# Patient Record
Sex: FEMALE | Race: WHITE | Employment: OTHER | URBAN - METROPOLITAN AREA
[De-identification: names, ages, dates, MRNs, and addresses within clinical notes are randomized per-mention and may not be internally consistent; named-entity substitution may affect disease eponyms.]

---

## 2017-01-27 ENCOUNTER — OFFICE VISIT (OUTPATIENT)
Dept: CARDIOLOGY CLINIC | Age: 82
End: 2017-01-27

## 2017-01-27 VITALS
HEIGHT: 64 IN | HEART RATE: 73 BPM | OXYGEN SATURATION: 98 % | SYSTOLIC BLOOD PRESSURE: 160 MMHG | BODY MASS INDEX: 22.84 KG/M2 | DIASTOLIC BLOOD PRESSURE: 78 MMHG | WEIGHT: 133.8 LBS

## 2017-01-27 DIAGNOSIS — I10 ESSENTIAL HYPERTENSION: ICD-10-CM

## 2017-01-27 DIAGNOSIS — E78.2 MIXED HYPERLIPIDEMIA: ICD-10-CM

## 2017-01-27 DIAGNOSIS — I25.10 CORONARY ARTERY DISEASE INVOLVING NATIVE CORONARY ARTERY OF NATIVE HEART WITHOUT ANGINA PECTORIS: Primary | ICD-10-CM

## 2017-01-27 RX ORDER — NEBIVOLOL 10 MG/1
10 TABLET ORAL DAILY
Qty: 30 TAB | Refills: 5 | Status: SHIPPED | OUTPATIENT
Start: 2017-01-27 | End: 2017-10-18 | Stop reason: SDUPTHER

## 2017-01-27 RX ORDER — SIMVASTATIN 40 MG/1
TABLET, FILM COATED ORAL
COMMUNITY

## 2017-01-27 NOTE — PROGRESS NOTES
LAST OFFICE VISIT : Visit date not found        ICD-10-CM ICD-9-CM   1. Coronary artery disease involving native coronary artery of native heart without angina pectoris I25.10 414.01            Anna Turner is a 80 y.o. female with hypertension and dyslipidemia referred for 6 month follow up. Cardiac risk factors: dyslipidemia, sedentary life style, hypertension, post-menopausal.   I have personally obtained the history from the patient. HISTORY OF PRESENTING ILLNESS      She is doing well. She continues to walk on her own with no issue. She stays her BP is consitantly elevated. The patient denies chest pain/ shortness of breath, orthopnea, PND, LE edema, palpitations, syncope, presyncope or fatigue. ACTIVE PROBLEM LIST     Patient Active Problem List    Diagnosis Date Noted    S/P CABG x 3 04/27/2012    Coronary artery disease 04/25/2012    Coronary atherosclerosis of native coronary artery 03/27/2012    Mixed hyperlipidemia 02/02/2011           PAST MEDICAL HISTORY     Past Medical History   Diagnosis Date    Anemia     CAD (coronary artery disease)     Carotid disease, bilateral ([address]     Coronary atherosclerosis of native coronary artery 3/27/2012    Essential hypertension     GERD (gastroesophageal reflux disease)     Hypertension      CONTROLLED BY MEDS.  Macular degeneration 2004    Mixed hyperlipidemia            PAST SURGICAL HISTORY     Past Surgical History   Procedure Laterality Date    Hx knee replacement       left in 2001    Vas carotid duplex bilateral  8/26/11     10-49% bilateral    Hx cholecystectomy      Hx cataract removal      Hx orthopaedic       LEFT KNEE REPLACEMENT.  Hx orthopaedic       BILATERAL 3 TRIGGER FINGERS IN [facility] HAND.  Hx other surgical       NODE FROM THYROID REMOVED. ALLERGIES     Allergies   Allergen Reactions    Codeine Other (comments)     Altered mental status.           FAMILY HISTORY     Family History Problem Relation Age of Onset    Coronary Artery Disease Mother    [facility] Cancer Father     Diabetes Sister     Coronary Artery Disease Brother     negative for cardiac disease       SOCIAL HISTORY     Social History     Social History    Marital status:      Spouse name: N/A    Number of children: N/A    Years of education: N/A     Social History Main Topics    Smoking status: Never Smoker    Smokeless tobacco: Never Used    Alcohol use No    Drug use: Yes     Special: OTC, Prescription    Sexual activity: No     Other Topics Concern    None     Social History Narrative         MEDICATIONS     Current Outpatient Prescriptions   Medication Sig    simvastatin (ZOCOR) 40 mg tablet Take  by mouth nightly.  nitroglycerin (NITROSTAT) 0.4 mg SL tablet 1 Tab by SubLINGual route every five (5) minutes as needed for Chest Pain.  nebivolol (BYSTOLIC) 5 mg tablet Take  by mouth daily.  sennosides (SENNA) 8.6 mg Cap Take  by mouth as needed.  ferrous sulfate (IRON, FERROUS SULFATE,) 325 mg (65 mg Iron) tablet Take  by mouth daily (before breakfast).  CALCIUM CARBONATE/VITAMIN D3 (CALTRATE-600 PLUS VITAMIN D3 PO) Take  by mouth daily.  MULTIVITAMINS W-MINERALS/LUT (CENTRUM SILVER PO) Take 1 Tab by mouth daily. TAKES  IN PM.    aspirin 81 mg tablet Take 81 mg by mouth two (2) times a day.  omeprazole (PRILOSEC) 20 mg capsule Take 20 mg by mouth daily.  alendronate (FOSAMAX) 40 mg tablet Take  by mouth every seven (7) days.  BETA-CAROTENE,A, W-C & E/MIN (ICAPS PO) Take 4 Tabs by mouth daily. No current facility-administered medications for this visit. I have reviewed the nurses notes, vitals, problem list, allergy list, medical history, family, social history and medications. REVIEW OF SYMPTOMS      General: Pt denies excessive weight gain or loss.  Pt is able to conduct ADL's  HEENT: Denies blurred vision, headaches, hearing loss, epistaxis and difficulty swallowing. Respiratory: Denies cough, congestion, shortness of breath, PEOPLES, wheezing or stridor. Cardiovascular: Denies precordial pain, palpitations, edema or PND  Gastrointestinal: Denies poor appetite, indigestion, abdominal pain or blood in stool  Genitourinary: Denies hematuria, dysuria, increased urinary frequency  Musculoskeletal: Denies joint pain or swelling from muscles or joints  Neurologic: Denies tremor, paresthesias, headache, or sensory motor disturbance  Psychiatric: Denies confusion, insomnia, depression  Integumentray: Denies rash, itching or ulcers. Hematologic: Denies easy bruising, bleeding     PHYSICAL EXAMINATION      Vitals:    01/27/17 1135 01/27/17 1150   BP: 160/80 160/78   Pulse: 73    SpO2: 98%    Weight: 133 lb 12.8 oz (60.7 kg)    Height: 5' 4\" (1.626 m)      BP recheck: 170/80    General: Well developed, in no acute distress. looks good for age  [de-identified]: No jaundice, oral mucosa moist, no oral ulcers  Neck: Supple, no stiffness, no lymphadenopathy, supple  Heart:  Normal S1/S2 negative S3 or S4. Regular, no murmur, gallop or rub, no jugular venous distention  Respiratory: Clear bilaterally x 4, no wheezing or rales  Extremities:  No edema, normal cap refill, no cyanosis. Musculoskeletal: No clubbing, no deformities  Neuro: A&Ox3, speech clear, gait stable, cooperative, no focal neurologic deficits  Skin: Skin color is normal. No rashes or lesions. Non diaphoretic, moist.        EKG: sinus rhythm at LBBB     DIAGNOSTIC DATA     1. Myocardial perfusion study   3/27/08 - Normal Adenosine perfusion with EF 57%    2. Cholesterol profile   4/27/13 - , HDL 61, LDL 72, TG 82  10/13/15 - , , HDL 50,   4/5/16- , HDL 54, , TG 84    3. Carotid duplex  9/12/11 - Intermediate disease bilateral.     4. Cardiac catheterization  3/12 - EF 65%, 95% oLAD, pLAD 95%, mLAD 70% followed by 70%, pCX 60%, OM1 50%, pRCA 50%,pRCA 100% (collateralized)    5.  CABG   4/27/12 - X3 with LIMA to LAD, SVG to OM and SVG to RCA       LABORATORY DATA            Lab Results   Component Value Date/Time    WBC 6.2 10/24/2015 05:30 PM    HGB 13.3 10/24/2015 05:30 PM    HCT 40.5 10/24/2015 05:30 PM    PLATELET 421 52/59/5173 05:30 PM    MCV 97.6 10/24/2015 05:30 PM      Lab Results   Component Value Date/Time    Sodium 139 10/24/2015 05:30 PM    Potassium 4.4 10/24/2015 05:30 PM    Chloride 104 10/24/2015 05:30 PM    CO2 30 10/24/2015 05:30 PM    Anion gap 5 10/24/2015 05:30 PM    Glucose 93 10/24/2015 05:30 PM    BUN 26 10/24/2015 05:30 PM    Creatinine 1.22 10/24/2015 05:30 PM    BUN/Creatinine ratio 21 10/24/2015 05:30 PM    GFR est AA 50 10/24/2015 05:30 PM    GFR est non-AA 41 10/24/2015 05:30 PM    Calcium 9.3 10/24/2015 05:30 PM    Bilirubin, total 0.4 04/28/2012 04:00 AM    ALT 17 04/28/2012 04:00 AM    AST 37 04/28/2012 04:00 AM    Alk. phosphatase 34 04/28/2012 04:00 AM    Protein, total 4.3 04/28/2012 04:00 AM    Albumin 2.3 04/28/2012 04:00 AM    Globulin 2.0 04/28/2012 04:00 AM    A-G Ratio 1.2 04/28/2012 04:00 AM           ASSESSMENT/RECOMMENDATIONS:.      1. Right carotid bruit  -carotids have not been done in some time and will plan on doing carotid US on return for her BP check    2. Dyslipidemia  -cholesterol has not been checked in some time  -will consider checking it at next office appointment   -LDL goal should be lower but at 93 I do not favor increasing her statins unless there is progression of her carotid disease     3. Chest discomfort   -no complaints of this today  -no cardiac testing needed  -would treat medically with no further testing in the future     4. Hypertension  -BP is elevated to day  -increase bystolic to 10 mg daily and have her return in 10 days for BP check     6.  Follow up in 6 months or PRN    Orders Placed This Encounter    AMB POC EKG ROUTINE W/ 12 LEADS, INTER & REP     Order Specific Question:   Reason for Exam:     Answer:   cad    simvastatin (ZOCOR) 40 mg tablet     Sig: Take  by mouth nightly. Follow-up Disposition: Not on File      I have discussed the diagnosis with  Gloria Lin and the intended plan as seen in the above orders. Questions were answered concerning future plans. I have discussed medication side effects and warnings with the patient as well. Thank you,  Stephie Escamilla MD for involving me in the care of  Gloria Lin. Please do not hesitate to contact me for further questions/concerns. This note was written by adrien Samano, as dictated by Parag Cheatham MD.      Kristina Duque. MD Gael, [facility] [address] [place] [address], [address] [place] [address] [address]      [phone] / [phone] Fax

## 2017-01-27 NOTE — PROGRESS NOTES
Visit Vitals    /78 (BP 1 Location: Right arm, BP Patient Position: Sitting)    Pulse 73    Ht 5' 4\" (1.626 m)    Wt 133 lb 12.8 oz (60.7 kg)    SpO2 98%    BMI 22.97 kg/m2

## 2017-01-27 NOTE — MR AVS SNAPSHOT
Visit Information Date & Time Provider Department Dept. Phone Encounter #  
 1/27/2017 10:40 AM Destiny Carrera MD [facility] [phone] [id number] Upcoming Health Maintenance Date Due DTaP/Tdap/Td series (1 - Tdap) 12/8/1944 ZOSTER VACCINE AGE 60> 12/8/1983 GLAUCOMA SCREENING Q2Y 12/8/1988 OSTEOPOROSIS SCREENING (DEXA) 12/8/1988 MEDICARE YEARLY EXAM 12/8/1988 Pneumococcal 65+ Low/Medium Risk (2 of 2 - PPSV23) 10/1/2015 INFLUENZA AGE 9 TO ADULT 8/1/2016 Allergies as of 1/27/2017  Review Complete On: 12/22/2015 By: Destiny Carrera MD  
  
 Severity Noted Reaction Type Reactions Codeine  02/02/2011    Other (comments) Altered mental status. Current Immunizations  Reviewed on 4/29/2012 Name Date Pneumococcal Vaccine (Unspecified Type) 10/1/2010 Not reviewed this visit You Were Diagnosed With   
  
 Codes Comments Coronary artery disease involving native coronary artery of native heart without angina pectoris    -  Primary ICD-10-CM: I25.10 ICD-9-CM: 414.01 Vitals BP Pulse Height(growth percentile) Weight(growth percentile) SpO2 BMI  
 160/78 (BP 1 Location: Right arm, BP Patient Position: Sitting) 73 5' 4\" (1.626 m) 133 lb 12.8 oz (60.7 kg) 98% 22.97 kg/m2 OB Status Smoking Status Postmenopausal Never Smoker Vitals History BMI and BSA Data Body Mass Index Body Surface Area  
 22.97 kg/m 2 1.66 m 2 Preferred Pharmacy Pharmacy Name Phone [id number] [place], [address] AT [facility] [phone] Your Updated Medication List  
  
   
This list is accurate as of: 1/27/17 12:04 PM.  Always use your most recent med list.  
  
  
  
  
 aspirin 81 mg tablet Take 81 mg by mouth two (2) times a day. CALTRATE-600 PLUS VITAMIN D3 PO Take  by mouth daily.   
  
 CENTRUM SILVER PO  
 Take 1 Tab by mouth daily. TAKES  IN PM.  
  
 FOSAMAX 40 mg tablet Generic drug:  alendronate Take  by mouth every seven (7) days. ICAPS PO Take 4 Tabs by mouth daily. Iron (Ferrous Sulfate) 325 mg (65 mg iron) tablet Generic drug:  ferrous sulfate Take  by mouth daily (before breakfast). nebivolol 10 mg tablet Commonly known as:  BYSTOLIC Take 1 Tab by mouth daily. nitroglycerin 0.4 mg SL tablet Commonly known as:  NITROSTAT  
1 Tab by SubLINGual route every five (5) minutes as needed for Chest Pain. PriLOSEC 20 mg capsule Generic drug:  omeprazole Take 20 mg by mouth daily. Senna 8.6 mg Cap Generic drug:  sennosides Take  by mouth as needed. simvastatin 40 mg tablet Commonly known as:  ZOCOR Take  by mouth nightly. Prescriptions Sent to Pharmacy Refills  
 nebivolol (BYSTOLIC) 10 mg tablet 5 Sig: Take 1 Tab by mouth daily. Class: Normal  
 Pharmacy: [address] [place] [address] Ph #: [phone] Route: Oral  
  
We Performed the Following AMB POC EKG ROUTINE W/ 12 LEADS, INTER & REP [06821 CPT(R)] DUPLEX CAROTID BILATERAL [id number] CPT(R)] Introducing [facility]! Dear Ken Orlando: Thank you for requesting a [employer] account. Our records indicate that you have previously registered for a [employer] account but its currently inactive. Please call our [employer] support line at [phone]. Additional Information If you have questions, please visit the Frequently Asked Questions section of the [employer] website at [email or web address] Remember, [employer] is NOT to be used for urgent needs. For medical emergencies, dial 911. Now available from your iPhone and Android! Please provide this summary of care documentation to your next provider. Your primary care clinician is listed as Lan Thorne.  If you have any questions after today's visit, please call [phone].

## 2017-02-10 ENCOUNTER — CLINICAL SUPPORT (OUTPATIENT)
Dept: CARDIOLOGY CLINIC | Age: 82
End: 2017-02-10

## 2017-02-10 VITALS
HEART RATE: 60 BPM | DIASTOLIC BLOOD PRESSURE: 80 MMHG | SYSTOLIC BLOOD PRESSURE: 150 MMHG | OXYGEN SATURATION: 97 % | RESPIRATION RATE: 18 BRPM

## 2017-02-10 DIAGNOSIS — I10 ESSENTIAL HYPERTENSION: Primary | ICD-10-CM

## 2017-02-10 DIAGNOSIS — I65.23 CAROTID STENOSIS, BILATERAL: Primary | ICD-10-CM

## 2017-02-10 NOTE — PROCEDURES
[facility]  *** FINAL REPORT ***    Name: Beba Strickland  MRN: [id number]       Outpatient  : 08 Dec 1923  HIS Order #: [id number]  [address] [facility] Visit #: [id number]  Date: 10 Feb 2017    TYPE OF TEST: Cerebrovascular Duplex    REASON FOR TEST  Known carotid stenosis    Right Carotid:-             Proximal               Mid                 Distal  cm/s  Systolic  Diastolic  Systolic  Diastolic  Systolic  Diastolic  CCA:     29.4       8.0                            59.0       9.0  Bulb:    54.0      14.0  ECA:     64.0       0.0  ICA:     60.0      13.0       89.0      16.0       77.0      18.0  ICA/CCA:  1.0       1.4    ICA Stenosis: Unknown    Right Vertebral:-  Finding: Antegrade  Sys:       63.0  [place]:       10.0    Right Subclavian:    Left Carotid:-            Proximal                Mid                 Distal  cm/s  Systolic  Diastolic  Systolic  Diastolic  Systolic  Diastolic  CCA:     97.9      11.0                            73.0      11.0  Bulb:    64.0      14.0  ECA:     64.0       7.0  ICA:    109.0      25.0       96.0      22.0      105.0      22.0  ICA/CCA:  1.5       2.3    ICA Stenosis: Unknown    Left Vertebral:-  Finding: Antegrade  Sys:       58.0  Medina:       14.0    Left Subclavian:    INTERPRETATION/FINDINGS  PROCEDURE:  Evaluation of the extracranial cerebrovascular arteries  with ultrasound (B-mode imaging, pulsed Doppler, color Doppler). Includes the common carotid, internal carotid, external carotid, and  vertebral arteries. FINDING:    Right:  Grayscale imaging reveals mild mixed plaque fromation within   the bifurcation of the internal carotid artery. Calcific plaque is  also seen within the proximal external carotid artery. Color flow  imaging demonstrates a mild filling defect at the site of the plaque  formation. Peak ICA velocities are recorded at 89 cm/s.     Left:  There is moderate calcific plaque that is visualized at the  level of the bifurcation extending into the origin of the internal  carotid artery. Acoustic shadowing causes drop out of the color flow  signal in this region. Peak ICA velocities are recorded at 109 cm/s. IMPRESSION: Findings are consistent with 10-49% stenosis of the right  internal carotid and 10-49%  stenosis of the left internal carotid. Vertebrals are patent with antegrade flow. ADDITIONAL COMMENTS    I have personally reviewed the data relevant to the interpretation of  this  study.     TECHNOLOGIST: RASHAAD Philip  Signed: 02/10/2017 04:03 PM    PHYSICIAN: Estephanie Cabrera MD, [facility]  Signed: 02/10/2017 04:43 PM

## 2017-02-14 ENCOUNTER — DOCUMENTATION ONLY (OUTPATIENT)
Dept: CARDIOLOGY CLINIC | Age: 82
End: 2017-02-14

## 2017-02-14 NOTE — PROGRESS NOTES
Debby GALLO   Female, 80 y. o., 12/8/1923  Weight:   133 lb 12.8 oz (60.7 kg)  Phone:   [phone]  PCP:   Yamil Acosta MD  MRN:   [id number]  MyChart:   Inactive  Next Appt:   08/01/2017     RE: bp check  Received: MD Perri Lancaster LPN                     Please let her know borderline high but at her age would like to refrain from increasing her meds. I favor reducing sodium and rechecking in 2 weeks.            Previous Messages       ----- Message -----   Tavo Orta From: Perri Cabrera LPN      Sent: 4/32/3019  11:32 AM        To:  Cristina Sy MD   Subject: bp check                                         Please see bp check from today increase in Bystolic

## 2017-10-19 RX ORDER — NEBIVOLOL 10 MG/1
10 TABLET ORAL DAILY
Qty: 30 TAB | Refills: 2 | Status: SHIPPED | OUTPATIENT
Start: 2017-10-19

## 2022-04-26 ENCOUNTER — OFFICE VISIT (OUTPATIENT)
Dept: CARDIOLOGY CLINIC | Age: 87
End: 2022-04-26
Payer: MEDICARE

## 2022-04-26 VITALS
HEIGHT: 64 IN | SYSTOLIC BLOOD PRESSURE: 185 MMHG | WEIGHT: 124.8 LBS | BODY MASS INDEX: 21.31 KG/M2 | HEART RATE: 72 BPM | DIASTOLIC BLOOD PRESSURE: 80 MMHG

## 2022-04-26 DIAGNOSIS — I44.7 LBBB (LEFT BUNDLE BRANCH BLOCK): ICD-10-CM

## 2022-04-26 DIAGNOSIS — E78.2 MIXED HYPERLIPIDEMIA: ICD-10-CM

## 2022-04-26 DIAGNOSIS — R60.0 LOWER EXTREMITY EDEMA: ICD-10-CM

## 2022-04-26 DIAGNOSIS — I38 VALVULAR HEART DISEASE: ICD-10-CM

## 2022-04-26 DIAGNOSIS — Z95.1 S/P CABG X 3: ICD-10-CM

## 2022-04-26 DIAGNOSIS — I25.10 CORONARY ARTERY DISEASE INVOLVING NATIVE CORONARY ARTERY OF NATIVE HEART WITHOUT ANGINA PECTORIS: ICD-10-CM

## 2022-04-26 DIAGNOSIS — I25.10 CORONARY ARTERY DISEASE INVOLVING NATIVE HEART, UNSPECIFIED VESSEL OR LESION TYPE, UNSPECIFIED WHETHER ANGINA PRESENT: Primary | ICD-10-CM

## 2022-04-26 PROCEDURE — G0463 HOSPITAL OUTPT CLINIC VISIT: HCPCS | Performed by: INTERNAL MEDICINE

## 2022-04-26 PROCEDURE — 93010 ELECTROCARDIOGRAM REPORT: CPT | Performed by: INTERNAL MEDICINE

## 2022-04-26 PROCEDURE — 93005 ELECTROCARDIOGRAM TRACING: CPT | Performed by: INTERNAL MEDICINE

## 2022-04-26 PROCEDURE — 99213 OFFICE O/P EST LOW 20 MIN: CPT | Performed by: INTERNAL MEDICINE

## 2022-04-26 NOTE — PROGRESS NOTES
Venecia Alvares MD, MS, [facility]            HISTORY OF PRESENT ILLNESS  Kenn Oliveira is 80 y.o. female past medical history of CAD status post CABG, hyperlipidemia referred for abnormal echocardiogram.    She had an echocardiogram done in her primary care office dated 4/14/2022. I do not have the images to review, but the report states normal left ventricular size and ejection fraction. Left atrial enlargement. Right atrial enlargement. Mild aortic insufficiency. Moderate mitral regurgitation. Mild to moderate pulmonic insufficiency. Moderate tricuspid regurgitation. Pulmonary hypertension. The PASP is reported as 45 mmHg. Ejection fraction 55%. Last seen by Dr. Wiley fair in 2017. Is maintained on aspirin, simvastatin, and Bystolic. She overall feels well. EKG done in the office reviewed - NSR, LBBB. LBBB was there upon review of 2017 EKG, QRS a bit longer today @ 132 msec. She denies any chest pain, shortness of breath. No palpitations or syncope. Minor swelling in both ankles, with bluish discoloration consistent with venous stasis disease. SUMMARY:   Problem List  Date Reviewed: 1/27/2017          Codes Class Noted    Valvular heart disease ICD-10-CM: I38  ICD-9-CM: 424.90  4/26/2022        Lower extremity edema ICD-10-CM: R60.0  ICD-9-CM: 782.3  4/26/2022        LBBB (left bundle branch block) ICD-10-CM: I44.7  ICD-9-CM: 426.3  4/26/2022        S/P CABG x 3 ICD-10-CM: Z95.1  ICD-9-CM: V45.81  4/27/2012    Overview Signed 4/27/2012 12:23 PM by Tania Ni PA-C     RODRIGUEZ to LAD, RSVG to OM, RSVG to RCA             Coronary artery disease ICD-10-CM: I25.10  ICD-9-CM: 414.00  4/25/2012        Mixed hyperlipidemia ICD-10-CM: E78.2  ICD-9-CM: 272.2  2/2/2011              Current Outpatient Medications on File Prior to Visit   Medication Sig    nebivolol (BYSTOLIC) 10 mg tablet Take 1 Tab by mouth daily.  Needs appointment to continue refills    simvastatin (ZOCOR) 40 mg tablet Take  by mouth nightly.  nitroglycerin (NITROSTAT) 0.4 mg SL tablet 1 Tab by SubLINGual route every five (5) minutes as needed for Chest Pain.  aspirin 81 mg tablet Take 81 mg by mouth two (2) times a day. No current facility-administered medications on file prior to visit. CARDIOLOGY STUDIES TO DATE:  No results found for this visit on 04/26/22. Chief Complaint   Patient presents with    Valvular Heart Disease     New Patient Evaluation    Swelling     HPI :  CARDIAC ROS:   negative    Family History   Problem Relation Age of Onset    Coronary Art Dis Mother     Cancer Father     Diabetes Sister     Coronary Art Dis Brother        Past Medical History:   Diagnosis Date    Anemia     CAD (coronary artery disease)     Carotid disease, bilateral ([address]     Coronary atherosclerosis of native coronary artery 3/27/2012    Essential hypertension     GERD (gastroesophageal reflux disease)     Hypertension     CONTROLLED BY MEDS.  Macular degeneration 2004    Mixed hyperlipidemia        GENERAL ROS:  A comprehensive review of systems was negative except for that written in the HPI. Visit Vitals  BP (!) 185/80   Pulse 72   Ht 5' 4\" (1.626 m)   Wt 124 lb 12.8 oz (56.6 kg)   BMI 21.42 kg/m²       Wt Readings from Last 3 Encounters:   04/26/22 124 lb 12.8 oz (56.6 kg)   01/27/17 133 lb 12.8 oz (60.7 kg)   12/22/15 135 lb 12.8 oz (61.6 kg)            BP Readings from Last 3 Encounters:   04/26/22 (!) 185/80   02/10/17 150/80   01/27/17 160/78       PHYSICAL EXAM  General appearance: alert, cooperative, no distress, appears stated age  Lungs: clear to auscultation bilaterally  Heart: systolic murmur: systolic ejection 2/6  Extremities:  Edema and discoloration consistent with venous stasis    No results found for: CHOL, CHOLX, CHLST, CHOLV, 159550, HDL, HDLP, LDL, LDLC, DLDLP, TGLX, TRIGL, TRIGP, CHHD, CHHDX  ASSESSMENT   Diagnoses and all orders for this visit:    1.  Coronary artery disease involving native heart, unspecified vessel or lesion type, unspecified whether angina present  -     AMB POC EKG ROUTINE W/ 12 LEADS, INTER & REP    2. Mixed hyperlipidemia  Assessment & Plan:  Continue current dose of simvastatin. Orders:  -     AMB POC EKG ROUTINE W/ 12 LEADS, INTER & REP    3. Coronary artery disease involving native coronary artery of native heart without angina pectoris  -     AMB POC EKG ROUTINE W/ 12 LEADS, INTER & REP    4. S/P CABG x 3  Assessment & Plan:  Stable on medical therapy. Orders:  -     AMB POC EKG ROUTINE W/ 12 LEADS, INTER & REP    5. Valvular heart disease  Assessment & Plan:  Consistent with age related degenerative valvular disease. Moderate MR/TR. No CHF symptoms. HR and HR control. Do not see a need for diuretics at this time. BP is elevated, likely situational being here in clinic today. Orders:  -     AMB POC EKG ROUTINE W/ 12 LEADS, INTER & REP    6. Lower extremity edema  Assessment & Plan:  Minor, consistent with with venous stasis disease. Elevation, compression hose. Orders:  -     AMB POC EKG ROUTINE W/ 12 LEADS, INTER & REP    7. LBBB (left bundle branch block)  Assessment & Plan:  Noted, on other conduction issues. Encounter Diagnoses   Name Primary?  Coronary artery disease involving native heart, unspecified vessel or lesion type, unspecified whether angina present Yes    Mixed hyperlipidemia     Coronary artery disease involving native coronary artery of native heart without angina pectoris     S/P CABG x 3     Valvular heart disease     Lower extremity edema     LBBB (left bundle branch block)      Orders Placed This Encounter    AMB POC EKG ROUTINE W/ 12 LEADS, INTER & REP       Follow-up and Dispositions    · Return in about 6 months (around 10/26/2022).          Carolyn Vegas MD  4/26/2022        [address]   [address]  [facility], [address] [place] [address]  [phone] ([phone]) [phone].  Home	[address], [address] [place]  [phone] (P)  [phone] (F)

## 2022-04-26 NOTE — LETTER
4/26/2022    Patient: Tomy Nuñez   YOB: 1923   Date of Visit: 4/26/2022     Lia Kapoor PA-C  [address]  [phone]  Via Fax: [phone]    Dear Lia Kapoor PA-C,      Thank you for referring Ms. Margarett Jolene Koyanagi to [facility] for evaluation. My notes for this consultation are attached. If you have questions, please do not hesitate to call me. I look forward to following your patient along with you.       Sincerely,    Asael Mcfadden MD

## 2022-04-26 NOTE — PROGRESS NOTES
Visit Vitals  BP (!) 185/80   Pulse 72   Ht 5' 4\" (1.626 m)   Wt 124 lb 12.8 oz (56.6 kg)   BMI 21.42 kg/m²

## 2022-04-26 NOTE — ASSESSMENT & PLAN NOTE
Consistent with age related degenerative valvular disease. Moderate MR/TR. No CHF symptoms. HR and HR control. Do not see a need for diuretics at this time. BP is elevated, likely situational being here in clinic today.

## 2022-11-10 ENCOUNTER — TELEPHONE (OUTPATIENT)
Dept: CARDIOLOGY CLINIC | Age: 87
End: 2022-11-10

## 2022-11-10 NOTE — TELEPHONE ENCOUNTER
[facility]. Patient due for follow up. When patient returns call please schedule next available established patient 6 mo follow up appointment and confirm location. Stable.

## 2022-11-11 ENCOUNTER — TELEPHONE (OUTPATIENT)
Dept: CARDIOLOGY CLINIC | Age: 87
End: 2022-11-11

## 2022-11-21 ENCOUNTER — OFFICE VISIT (OUTPATIENT)
Dept: CARDIOLOGY CLINIC | Age: 87
End: 2022-11-21
Payer: MEDICARE

## 2022-11-21 VITALS
HEART RATE: 64 BPM | HEIGHT: 64 IN | WEIGHT: 115 LBS | BODY MASS INDEX: 19.63 KG/M2 | OXYGEN SATURATION: 98 % | SYSTOLIC BLOOD PRESSURE: 160 MMHG | DIASTOLIC BLOOD PRESSURE: 88 MMHG

## 2022-11-21 DIAGNOSIS — I25.10 CORONARY ARTERY DISEASE INVOLVING NATIVE CORONARY ARTERY OF NATIVE HEART WITHOUT ANGINA PECTORIS: ICD-10-CM

## 2022-11-21 DIAGNOSIS — E78.2 MIXED HYPERLIPIDEMIA: ICD-10-CM

## 2022-11-21 DIAGNOSIS — Z95.1 S/P CABG X 3: ICD-10-CM

## 2022-11-21 DIAGNOSIS — I10 PRIMARY HYPERTENSION: ICD-10-CM

## 2022-11-21 DIAGNOSIS — I44.7 LBBB (LEFT BUNDLE BRANCH BLOCK): Primary | ICD-10-CM

## 2022-11-21 DIAGNOSIS — I38 VALVULAR HEART DISEASE: ICD-10-CM

## 2022-11-21 DIAGNOSIS — Z95.0 PACEMAKER: ICD-10-CM

## 2022-11-21 DIAGNOSIS — R60.0 LOWER EXTREMITY EDEMA: ICD-10-CM

## 2022-11-21 PROCEDURE — 99214 OFFICE O/P EST MOD 30 MIN: CPT | Performed by: INTERNAL MEDICINE

## 2022-11-21 PROCEDURE — G0463 HOSPITAL OUTPT CLINIC VISIT: HCPCS | Performed by: INTERNAL MEDICINE

## 2022-11-21 PROCEDURE — 1123F ACP DISCUSS/DSCN MKR DOCD: CPT | Performed by: INTERNAL MEDICINE

## 2022-11-21 RX ORDER — FUROSEMIDE 20 MG/1
TABLET ORAL DAILY
COMMUNITY

## 2022-11-21 RX ORDER — LISINOPRIL 10 MG/1
TABLET ORAL DAILY
COMMUNITY

## 2022-11-21 RX ORDER — PANTOPRAZOLE SODIUM 40 MG/1
40 TABLET, DELAYED RELEASE ORAL DAILY
COMMUNITY

## 2022-11-21 NOTE — PROGRESS NOTES
Lizabeth Kocher is a 80 y.o. female    Visit Vitals  BP (!) 160/88 (BP 1 Location: Left upper arm, BP Patient Position: Sitting, BP Cuff Size: Adult)   Pulse 64   Ht 5' 4\" (1.626 m)   Wt 115 lb (52.2 kg)   SpO2 98%   BMI 19.74 kg/m²       Chief Complaint   Patient presents with    Hospital Follow Up    Follow-up       Chest pain NO  SOB NO  Dizziness NO  Swelling FEET    Recent hospital visit [facility], PT CAN GIVE INFO IF NEEDED. [facility] [address] [facility]    Refills NO  COVID VACCINE STATUS YES  HAD COVID? NO    SHOULD PT HAVE NITROGLYCERIN ON HAND?

## 2022-11-21 NOTE — PROGRESS NOTES
Taylor Lu MD, MS, [address] [facility]            HISTORY OF PRESENT ILLNESS:    Artur Herrera is a 80 y.o. female here for FU. Past medical history of CAD status post CABG, hyperlipidemia referred for abnormal echocardiogram.     She had an echocardiogram done in her primary care office dated 4/14/2022. I do not have the images to review, but the report states normal left ventricular size and ejection fraction. Left atrial enlargement. Right atrial enlargement. Mild aortic insufficiency. Moderate mitral regurgitation. Mild to moderate pulmonic insufficiency. Moderate tricuspid regurgitation. Pulmonary hypertension. The PASP is reported as 45 mmHg. Ejection fraction 55%. Last seen by Dr. Josue fair in 2017. Is maintained on aspirin, simvastatin, and lisinopril. BP a bit elaevted today. Advised monitor a, bualtory BP checks. She overall feels well. EKG done in the office reviewed - NSR, LBBB. LBBB was there upon review of 2017 EKG, QRS a bit longer today @ 132 msec. In the interim, had Medtronic PPM (serial number [id number]) (Model number [id number]) Implanted 6/14/2022. placed in [facility] 6/2022. She was lightheaded, SOB. Needs education on Medtronic remote interrogations, appt in device clinic.     Seems to be a micro AV leadless PPM.     SUMMARY:   Problem List  Date Reviewed: 11/21/2022            Codes Class Noted    Pacemaker ICD-10-CM: Z95.0  ICD-9-CM: V45.01  11/21/2022        Primary hypertension ICD-10-CM: I10  ICD-9-CM: 401.9  11/21/2022        Valvular heart disease ICD-10-CM: I38  ICD-9-CM: 424.90  4/26/2022        Lower extremity edema ICD-10-CM: R60.0  ICD-9-CM: 782.3  4/26/2022        LBBB (left bundle branch block) ICD-10-CM: I44.7  ICD-9-CM: 426.3  4/26/2022        S/P CABG x 3 ICD-10-CM: Z95.1  ICD-9-CM: V45.81  4/27/2012    Overview Signed 4/27/2012 12:23 PM by Garrett Nunes PA-C     RODRIGUEZ to LAD, RSVG to OM, RSVG to RCA             Coronary artery disease ICD-10-CM: I25.10  ICD-9-CM: 414.00  4/25/2012        Mixed hyperlipidemia ICD-10-CM: E78.2  ICD-9-CM: 272.2  2/2/2011           Current Outpatient Medications on File Prior to Visit   Medication Sig    lisinopriL (PRINIVIL, ZESTRIL) 10 mg tablet Take  by mouth daily. pantoprazole (PROTONIX) 40 mg tablet Take 40 mg by mouth daily. furosemide (LASIX) 20 mg tablet Take  by mouth daily. ONLY WHEN NEEDED. simvastatin (ZOCOR) 40 mg tablet Take  by mouth nightly. aspirin 81 mg tablet Take 81 mg by mouth daily. nitroglycerin (NITROSTAT) 0.4 mg SL tablet 1 Tab by SubLINGual route every five (5) minutes as needed for Chest Pain. No current facility-administered medications on file prior to visit. CARDIOLOGY STUDIES TO DATE:  No results found for this visit on 11/21/22.         03/23/12    NM CARDIAC SPECT W STRS / REST MULT 03/26/2012 3/26/2012    Narrative  **Final Report**      ICD Codes / Adm. Diagnosis: 786.50  786.59 / CHEST PAIN, UNSPECIFIED  Examination:  [facility]  - [id number] - Mar 23 2012 11:16AM  Accession No:  [id number]  Reason:  chest pains      REPORT:  One day rest/stress Lexiscan gated SPECT myocardial perfusion study    INDICATION:    RADIOISOTOPE:  Resting dose: 10 mCi Tc 99m Sestamibi  Stress dose: 30 mCi Tc 99m Sestamibi    STRESS TEST: Chest pain noted during Lexiscan Injection. Resting EKG noted  NSR and LBBB. There were further 2 mm ST depression in inferior and lateral  leads per stress report after Lexiscan injection. MYOCARDIAL PERFUSION: Stress and rest tomograms demonstrate a large sized,  mod-to-high grade grade, reversible defect involving the proximal to distal  anteroseptal, septal, inferoseptal and apical walls. Findings indicate  ischemia in the LAD and RCA distribution. LV cavity size is normal.    GATED STUDY: LV systolic function moderately depressed with LVEF 34%. There  is septal dyskinesis.       IMPRESSION: 1) Subjectively positive test.  2) Abnormal Lexiscan gated SPECT  myocardial perfusion study. Stress and rest tomograms demonstrate a large  sized, mod-to-high grade grade, reversible defect involving the proximal to  distal anteroseptal, septal, inferoseptal and apical walls. Findings  indicate ischemia in the LAD and RCA distribution. LVEF 34%. Signing/Reading Doctor: Melita Alvarado ([id number])  Approved: Melita Alvarado ([id number])  03/26/2012    Signed by: Octaviano Napoles MD on 3/26/2012 11:11 AM         CARDIAC ROS:   negative    Past Medical History:   Diagnosis Date    Anemia     CAD (coronary artery disease)     Carotid disease, bilateral ([address]     Coronary atherosclerosis of native coronary artery 3/27/2012    Essential hypertension     GERD (gastroesophageal reflux disease)     Hypertension     CONTROLLED BY MEDS. Macular degeneration 2004    Mixed hyperlipidemia        Family History   Problem Relation Age of Onset    Coronary Art Dis Mother     Cancer Father     Diabetes Sister     Coronary Art Dis Brother        Social History     Socioeconomic History    Marital status:      Spouse name: Not on file    Number of children: Not on file    Years of education: Not on file    Highest education level: Not on file   Occupational History    Not on file   Tobacco Use    Smoking status: Never    Smokeless tobacco: Never   Substance and Sexual Activity    Alcohol use: No    Drug use: Yes     Types: OTC, Prescription    Sexual activity: Never   Other Topics Concern    Not on file   Social History Narrative    Not on file     Social Determinants of Health     Financial Resource Strain: Not on file   Food Insecurity: Not on file   Transportation Needs: Not on file   Physical Activity: Not on file   Stress: Not on file   Social Connections: Not on file   Intimate Partner Violence: Not on file   Housing Stability: Not on file        GENERAL ROS:  A comprehensive review of systems was negative except for that written in the HPI.     Visit Vitals  BP (!) 160/88 (BP 1 Location: Left upper arm, BP Patient Position: Sitting, BP Cuff Size: Adult)   Pulse 64   Ht 5' 4\" (1.626 m)   Wt 52.2 kg (115 lb)   SpO2 98%   BMI 19.74 kg/m²     Vitals:    11/21/22 1554 11/21/22 1614   BP: (!) 160/88 (!) 160/88   Pulse: 64    SpO2: 98%    Weight: 52.2 kg (115 lb)    Height: 5' 4\" (1.626 m)         Wt Readings from Last 3 Encounters:   11/21/22 52.2 kg (115 lb)   04/26/22 56.6 kg (124 lb 12.8 oz)   01/27/17 60.7 kg (133 lb 12.8 oz)            BP Readings from Last 3 Encounters:   11/21/22 (!) 160/88   04/26/22 (!) 185/80   02/10/17 150/80       PHYSICAL EXAM  General appearance: alert, cooperative, no distress, appears stated age  Neck: supple, symmetrical, trachea midline, no adenopathy, thyroid: not enlarged, symmetric, no tenderness/mass/nodules, no carotid bruit, and no JVD  Lungs: clear to auscultation bilaterally  Heart: regular rate and rhythm, S1, S2 normal, no murmur, click, rub or gallop  Extremities: extremities normal, atraumatic, no cyanosis or edema    No results found for: CHOL, CHOLX, CHLST, CHOLV, 272422, HDL, HDLP, LDL, LDLC, DLDLP, TGLX, TRIGL, TRIGP, CHHD, CHHDX    Lab Results   Component Value Date/Time    WBC 6.2 10/24/2015 05:30 PM    HGB 13.3 10/24/2015 05:30 PM    HCT 40.5 10/24/2015 05:30 PM    PLATELET 646 51/02/2795 05:30 PM    MCV 97.6 10/24/2015 05:30 PM        No results found for: CHOL, CHOLPOCT, CHOLX, CHLST, CHOLV, HDL, HDLP, LDL, LDLC, DLDLP, TGLX, TRIGL, TRIGP, TGLPOCT, NTGLT, CHHD, CHHDX     ASSESSMENT  Diagnoses and all orders for this visit:    1. LBBB (left bundle branch block)    2. Coronary artery disease involving native coronary artery of native heart without angina pectoris    3. Valvular heart disease    4. S/P CABG x 3    5. Mixed hyperlipidemia    6. Lower extremity edema    7. Pacemaker    8. Primary hypertension       Encounter Diagnoses   Name Primary?     LBBB (left bundle branch block) Yes    Coronary artery disease involving native coronary artery of native heart without angina pectoris     Valvular heart disease     S/P CABG x 3     Mixed hyperlipidemia     Lower extremity edema     Pacemaker     Primary hypertension      Orders Placed This Encounter    lisinopriL (PRINIVIL, ZESTRIL) 10 mg tablet    pantoprazole (PROTONIX) 40 mg tablet    furosemide (LASIX) 20 mg tablet         Day Peacock MD  11/21/2022        [address] [facility]  [address]  [phone] (F)    [address] [place] [id number] [address]  [phone] (P)  [phone] (F)    ATTENTION:   This medical record was transcribed using an electronic medical records/speech recognition system. Although proofread, it may and can contain electronic, spelling and other errors. Corrections may be executed at a later time. Please feel free to contact us for any clarifications as needed.

## 2023-02-15 ENCOUNTER — DOCUMENTATION ONLY (OUTPATIENT)
Dept: CARDIOLOGY | Age: 88
End: 2023-02-15

## 2023-02-15 ENCOUNTER — CLINICAL SUPPORT (OUTPATIENT)
Dept: CARDIOLOGY CLINIC | Age: 88
End: 2023-02-15
Payer: MEDICARE

## 2023-02-15 ENCOUNTER — OFFICE VISIT (OUTPATIENT)
Dept: CARDIOLOGY CLINIC | Age: 88
End: 2023-02-15
Payer: MEDICARE

## 2023-02-15 VITALS
OXYGEN SATURATION: 98 % | HEIGHT: 64 IN | HEART RATE: 58 BPM | RESPIRATION RATE: 18 BRPM | DIASTOLIC BLOOD PRESSURE: 110 MMHG | SYSTOLIC BLOOD PRESSURE: 166 MMHG | WEIGHT: 114 LBS | BODY MASS INDEX: 19.46 KG/M2

## 2023-02-15 VITALS
BODY MASS INDEX: 19.46 KG/M2 | HEIGHT: 64 IN | WEIGHT: 114 LBS | SYSTOLIC BLOOD PRESSURE: 166 MMHG | HEART RATE: 58 BPM | DIASTOLIC BLOOD PRESSURE: 110 MMHG

## 2023-02-15 DIAGNOSIS — Z95.0 PRESENCE OF PERMANENT CARDIAC PACEMAKER: Primary | ICD-10-CM

## 2023-02-15 DIAGNOSIS — R60.0 LOWER EXTREMITY EDEMA: ICD-10-CM

## 2023-02-15 DIAGNOSIS — E78.2 MIXED HYPERLIPIDEMIA: ICD-10-CM

## 2023-02-15 DIAGNOSIS — I25.10 CORONARY ARTERY DISEASE INVOLVING NATIVE CORONARY ARTERY OF NATIVE HEART WITHOUT ANGINA PECTORIS: ICD-10-CM

## 2023-02-15 DIAGNOSIS — I44.7 LBBB (LEFT BUNDLE BRANCH BLOCK): Primary | ICD-10-CM

## 2023-02-15 DIAGNOSIS — Z95.1 S/P CABG X 3: ICD-10-CM

## 2023-02-15 DIAGNOSIS — Z95.0 CARDIAC PACEMAKER IN SITU: Primary | ICD-10-CM

## 2023-02-15 DIAGNOSIS — Z95.0 PACEMAKER: ICD-10-CM

## 2023-02-15 DIAGNOSIS — I44.2 COMPLETE AV BLOCK (HCC): ICD-10-CM

## 2023-02-15 DIAGNOSIS — I38 VALVULAR HEART DISEASE: ICD-10-CM

## 2023-02-15 DIAGNOSIS — I10 PRIMARY HYPERTENSION: ICD-10-CM

## 2023-02-15 PROCEDURE — G0463 HOSPITAL OUTPT CLINIC VISIT: HCPCS | Performed by: INTERNAL MEDICINE

## 2023-02-15 NOTE — PROGRESS NOTES
Cardiac Electrophysiology New Office Visit    NAME:  Estelita Vera   :  1923  MRM:  [id number]    Date:  2/15/2023     Primary Cardiologist: Dr. Lola Neil    She is referred from Dr Lola Neil for leadless pacer management    Assessment and Plan:     1. Presence of permanent cardiac pacemaker-leadless Medtronic micra  2. Coronary artery disease involving native coronary artery of native heart without angina pectoris  3. S/P CABG x 3  4. Valvular heart disease  5. Primary hypertension  6. Mixed hyperlipidemia       Pacemaker. Implanted 22 with Dr. Reza Holder in [place]. MICRA leadless pacemaker  Greater than 8 years on battery  VDD 60  99.9% V-paced  No arrhythmias can be noted with this type of pacer  - Continue Q3 month remote transmissions. Follow-up in EP clinic in one year, or sooner for any concerns. CAD.  - S/p CABG x3.  - Continue aspirin and statin. Hypertension.  - Controlled on lisinopril. She said she did not take bp meds today yet  - Echocardiogram done in her primary care office dated 2022. Report states normal left ventricular size and ejection fraction. Left atrial enlargement. Right atrial enlargement. Mild aortic insufficiency. Moderate mitral regurgitation. Mild to moderate pulmonic insufficiency. Moderate tricuspid regurgitation. Pulmonary hypertension. The PASP is reported as 45 mmHg. Ejection fraction 55%      Subjective:     Estelita Vera, a 80y.o. year-old who presents for followup of pacemaker. Review of Systems:   12 point review of systems was performed.  All negative except for HPI    Exam:     Physical Exam:    Visit Vitals  BP (!) 166/110   Pulse (!) 58   Resp 18   Ht 5' 4\" (1.626 m)   Wt 114 lb (51.7 kg)   SpO2 98%   BMI 19.57 kg/m²       PHYSICAL EXAM  General:  Alert and oriented, in no acute distress  Head:  Atraumatic, normocephalic  Eyes:  extraocular muscles intact  Neck:  Supple, normal range of motion  Lungs:  Clear to auscultation bilaterally, no wheezes/rales/rhonchi   Cardiovascular:  Regular rate and rhythm, no murmurs/rubs/gallops  Abdomen:  Soft, nontender, nondistended, normoactive bowel sounds  Skin:  Intact, no rash  Extremities:, no clubbing, cyanosis, or edema  Musculoskeletal: normal range of motion  Neurological:  Alert and oriented, no focal neurologic deficits  Psychiatric:  Normal mood and affect      Medications:     Current Outpatient Medications   Medication Sig    lisinopriL (PRINIVIL, ZESTRIL) 10 mg tablet Take  by mouth daily. pantoprazole (PROTONIX) 40 mg tablet Take 40 mg by mouth daily. furosemide (LASIX) 20 mg tablet Take  by mouth daily. ONLY WHEN NEEDED. simvastatin (ZOCOR) 40 mg tablet Take  by mouth nightly. nitroglycerin (NITROSTAT) 0.4 mg SL tablet 1 Tab by SubLINGual route every five (5) minutes as needed for Chest Pain. aspirin 81 mg tablet Take 81 mg by mouth daily. No current facility-administered medications for this visit. Diagnostic Data Review:       Results for orders placed or performed during the hospital encounter of 04/27/12   EKG, 12 LEAD, INITIAL   Result Value Ref Range    Ventricular Rate 113 BPM    Atrial Rate 117 BPM    QRS Duration 114 ms    Q-T Interval 294 ms    QTC Calculation (Bezet) 403 ms    Calculated R Axis -49 degrees    Calculated T Axis 54 degrees    Diagnosis       Atrial fibrillation  Nonspecific intraventricular conduction delay  Left axis deviation  Low voltage QRS  Possible Anterolateral infarct , age undetermined  When compared with ECG of 25-APR-2012 11:57,  Atrial fibrillation is now present  Vent. rate has increased BY  54 BPM  Borderline criteria for Anterior infarct are now present  Borderline criteria for Anterolateral infarct are now present  Confirmed by Marcos Dennis M.D., Newton Morse ([id number]) on 4/30/2012 3:35:33 PM      1. Myocardial perfusion study   3/27/08 - Normal Adenosine perfusion with EF 57%    2.  Cholesterol profile   4/27/13 - , HDL 61, LDL 72, TG 82  10/13/15 - , , HDL 50,   4/5/16- , HDL 54, , TG 84    3. Carotid duplex  9/12/11 - Intermediate disease bilateral.   2/10/17- 10-49% L/R      4. Cardiac catheterization  3/12 - EF 65%, 95% oLAD, pLAD 95%, mLAD 70% followed by 70%, pCX 60%, OM1 50%, pRCA 50%,pRCA 100% (collateralized)    5. CABG   4/27/12 - X3 with LIMA to LAD, SVG to OM and SVG to RCA      Lab Review:     Lab Results   Component Value Date/Time    Creatinine 1.22 (H) 10/24/2015 05:30 PM     Lab Results   Component Value Date/Time    BUN 26 (H) 10/24/2015 05:30 PM     Lab Results   Component Value Date/Time    Potassium 4.4 10/24/2015 05:30 PM     Lab Results   Component Value Date/Time    Hemoglobin A1c 5.6 04/25/2012 11:24 AM     Lab Results   Component Value Date/Time    HGB 13.3 10/24/2015 05:30 PM     Lab Results   Component Value Date/Time    PLATELET 178 11/60/1014 05:30 PM                  ___________________________________________________    Anita Lucero M.D.   Electrophysiology/Cardiology  [facility]  [address], [place] [address] [address]                               [phone]

## 2023-02-15 NOTE — PROGRESS NOTES
Eliel Isaac MD, MS, [address] [facility]            HISTORY OF PRESENT ILLNESS:    Denzel Healy is a 80 y.o. female here for FU. Past medical history of CAD status post CABG, hyperlipidemia referred for abnormal echocardiogram.     She had an echocardiogram done in her primary care office dated 4/14/2022. I do not have the images to review, but the report states normal left ventricular size and ejection fraction. Left atrial enlargement. Right atrial enlargement. Mild aortic insufficiency. Moderate mitral regurgitation. Mild to moderate pulmonic insufficiency. Moderate tricuspid regurgitation. Pulmonary hypertension. The PASP is reported as 45 mmHg. Ejection fraction 55%. Is maintained on aspirin, simvastatin, and lisinopril. BP elevated today. Advised monitor ambulatory BP checks. Daughter states she has not had her lisinopril yet today. She overall feels well. EKG NSR, LBBB. LBBB was there upon review of 2017 EKG, QRS a bit longer today @ 132 msec. In the interim, had Medtronic PPM (serial number [id number]) (Model number [id number]) Implanted 6/14/2022. Placed in [facility] 6/2022. She was lightheaded, SOB. Needs education on Medtronic remote interrogations, appt in device clinic - seeing Dr. Bridges Model today.   Seems to be a micro AV leadless PPM.         SUMMARY:   Problem List  Date Reviewed: 2/15/2023            Codes Class Noted    Pacemaker ICD-10-CM: Z95.0  ICD-9-CM: V45.01  11/21/2022        Primary hypertension ICD-10-CM: I10  ICD-9-CM: 401.9  11/21/2022        Valvular heart disease ICD-10-CM: I38  ICD-9-CM: 424.90  4/26/2022        Lower extremity edema ICD-10-CM: R60.0  ICD-9-CM: 782.3  4/26/2022        LBBB (left bundle branch block) ICD-10-CM: I44.7  ICD-9-CM: 426.3  4/26/2022        S/P CABG x 3 ICD-10-CM: Z95.1  ICD-9-CM: V45.81  4/27/2012    Overview Signed 4/27/2012 12:23 PM by Valencia Nava PA-C     RODRIGUEZ to LAD, RSVG to OM, RSVG to RCA             Coronary artery disease ICD-10-CM: I25.10  ICD-9-CM: 414.00  4/25/2012        Mixed hyperlipidemia ICD-10-CM: E78.2  ICD-9-CM: 272.2  2/2/2011           Current Outpatient Medications on File Prior to Visit   Medication Sig    lisinopriL (PRINIVIL, ZESTRIL) 10 mg tablet Take  by mouth daily. pantoprazole (PROTONIX) 40 mg tablet Take 40 mg by mouth daily. furosemide (LASIX) 20 mg tablet Take  by mouth daily. ONLY WHEN NEEDED. simvastatin (ZOCOR) 40 mg tablet Take  by mouth nightly. nitroglycerin (NITROSTAT) 0.4 mg SL tablet 1 Tab by SubLINGual route every five (5) minutes as needed for Chest Pain. aspirin 81 mg tablet Take 81 mg by mouth daily. No current facility-administered medications on file prior to visit. CARDIOLOGY STUDIES TO DATE:  No results found for this visit on 02/15/23.         03/23/12    NM CARDIAC SPECT W STRS / REST MULT 03/26/2012 3/26/2012    Narrative  **Final Report**      ICD Codes / Adm. Diagnosis: 786.50  786.59 / CHEST PAIN, UNSPECIFIED  Examination:  NM CARD SPECT MULT [place] EF  - [id number] - Mar 23 2012 11:16AM  Accession No:  [id number]  Reason:  chest pains      REPORT:  One day rest/stress Lexiscan gated SPECT myocardial perfusion study    INDICATION:    RADIOISOTOPE:  Resting dose: 10 mCi Tc 99m Sestamibi  Stress dose: 30 mCi Tc 99m Sestamibi    STRESS TEST: Chest pain noted during Lexiscan Injection. Resting EKG noted  NSR and LBBB. There were further 2 mm ST depression in inferior and lateral  leads per stress report after Lexiscan injection. MYOCARDIAL PERFUSION: Stress and rest tomograms demonstrate a large sized,  mod-to-high grade grade, reversible defect involving the proximal to distal  anteroseptal, septal, inferoseptal and apical walls. Findings indicate  ischemia in the LAD and RCA distribution. LV cavity size is normal.    GATED STUDY: LV systolic function moderately depressed with LVEF 34%. There  is septal dyskinesis.       IMPRESSION: 1) Subjectively positive test.  2) Abnormal Lexiscan gated SPECT  myocardial perfusion study. Stress and rest tomograms demonstrate a large  sized, mod-to-high grade grade, reversible defect involving the proximal to  distal anteroseptal, septal, inferoseptal and apical walls. Findings  indicate ischemia in the LAD and RCA distribution. LVEF 34%. Signing/Reading Doctor: Paul Prieto ([id number])  Approved: Paul Prieto ([id number])  03/26/2012    Signed by: Diamond Hill MD on 3/26/2012 11:11 AM         CARDIAC ROS:   negative    Past Medical History:   Diagnosis Date    Anemia     CAD (coronary artery disease)     Carotid disease, bilateral ([address]     Coronary atherosclerosis of native coronary artery 3/27/2012    Essential hypertension     GERD (gastroesophageal reflux disease)     Hypertension     CONTROLLED BY MEDS.     Macular degeneration 2004    Mixed hyperlipidemia        Family History   Problem Relation Age of Onset    Coronary Art Dis Mother     Cancer Father     Diabetes Sister     Coronary Art Dis Brother        Social History     Socioeconomic History    Marital status:      Spouse name: Not on file    Number of children: Not on file    Years of education: Not on file    Highest education level: Not on file   Occupational History    Not on file   Tobacco Use    Smoking status: Never    Smokeless tobacco: Never   Substance and Sexual Activity    Alcohol use: No    Drug use: Yes     Types: OTC, Prescription    Sexual activity: Never   Other Topics Concern    Not on file   Social History Narrative    Not on file     Social Determinants of Health     Financial Resource Strain: Not on file   Food Insecurity: Not on file   Transportation Needs: Not on file   Physical Activity: Not on file   Stress: Not on file   Social Connections: Not on file   Intimate Partner Violence: Not on file   Housing Stability: Not on file        GENERAL ROS:  A comprehensive review of systems was negative except for that written in the HPI.    Visit Vitals  BP (!) 166/110 (BP 1 Location: Left upper arm, BP Patient Position: Sitting)   Pulse (!) 58   Ht 5' 4\" (1.626 m)   Wt 51.7 kg (114 lb)   BMI 19.57 kg/m²     Vitals:    02/15/23 0828 02/15/23 0851   BP: (!) 180/100 (!) 166/110   Pulse: (!) 58    Weight: 51.7 kg (114 lb)    Height: 5' 4\" (1.626 m)         Wt Readings from Last 3 Encounters:   02/15/23 51.7 kg (114 lb)   11/21/22 52.2 kg (115 lb)   04/26/22 56.6 kg (124 lb 12.8 oz)            BP Readings from Last 3 Encounters:   02/15/23 (!) 166/110   11/21/22 (!) 160/88   04/26/22 (!) 185/80       PHYSICAL EXAM  General appearance: alert, cooperative, no distress, appears stated age  Neck: supple, symmetrical, trachea midline, no adenopathy, thyroid: not enlarged, symmetric, no tenderness/mass/nodules, no carotid bruit, and no JVD  Lungs: clear to auscultation bilaterally  Heart: regular rate and rhythm, S1, S2 normal, no murmur, click, rub or gallop  Extremities: extremities normal, atraumatic, no cyanosis or edema    No results found for: CHOL, CHOLX, CHLST, CHOLV, 616807, HDL, HDLP, LDL, LDLC, DLDLP, TGLX, TRIGL, TRIGP, CHHD, CHHDX    Lab Results   Component Value Date/Time    WBC 6.2 10/24/2015 05:30 PM    HGB 13.3 10/24/2015 05:30 PM    HCT 40.5 10/24/2015 05:30 PM    PLATELET 796 58/82/9048 05:30 PM    MCV 97.6 10/24/2015 05:30 PM        No results found for: CHOL, CHOLPOCT, CHOLX, CHLST, CHOLV, HDL, HDLP, LDL, LDLC, DLDLP, TGLX, TRIGL, TRIGP, TGLPOCT, NTGLT, CHHD, CHHDX     ASSESSMENT  Diagnoses and all orders for this visit:    1. LBBB (left bundle branch block)    2. Coronary artery disease involving native coronary artery of native heart without angina pectoris    3. Valvular heart disease    4. S/P CABG x 3    5. Mixed hyperlipidemia    6. Lower extremity edema    7. Pacemaker    8. Primary hypertension         Encounter Diagnoses   Name Primary?     LBBB (left bundle branch block) Yes    Coronary artery disease involving native coronary artery of native heart without angina pectoris     Valvular heart disease     S/P CABG x 3     Mixed hyperlipidemia     Lower extremity edema     Pacemaker     Primary hypertension        No orders of the defined types were placed in this encounter. Herb Ramos MD  2/15/2023        [address] [place]   [address]  [address]  [phone] (F)    [address] [facility]  [address]  [phone] (P)  [phone] (F)    ATTENTION:   This medical record was transcribed using an electronic medical records/speech recognition system. Although proofread, it may and can contain electronic, spelling and other errors. Corrections may be executed at a later time. Please feel free to contact us for any clarifications as needed.

## 2023-02-15 NOTE — PROGRESS NOTES
Yasmeen Baker MD, MS, [address]            HISTORY OF PRESENT ILLNESS:    Denis Plata is a 80 y.o. female here for FU. Past medical history of CAD status post CABG, hyperlipidemia referred for abnormal echocardiogram.     She had an echocardiogram done in her primary care office dated 4/14/2022. I do not have the images to review, but the report states normal left ventricular size and ejection fraction. Left atrial enlargement. Right atrial enlargement. Mild aortic insufficiency. Moderate mitral regurgitation. Mild to moderate pulmonic insufficiency. Moderate tricuspid regurgitation. Pulmonary hypertension. The PASP is reported as 45 mmHg. Ejection fraction 55%. Last seen by Dr. Luh fair in 2017. Is maintained on aspirin, simvastatin, and lisinopril. BP a bit elaevted today. Advised monitor a, bualtory BP checks. She overall feels well. EKG done in the office reviewed - NSR, LBBB. LBBB was there upon review of 2017 EKG, QRS a bit longer today @ 132 msec. In the interim, had Medtronic PPM (serial number [id number]) (Model number [id number]) Implanted 6/14/2022. placed in [facility] 6/2022. She was lightheaded, SOB. Needs education on Medtronic remote interrogations, appt in device clinic. Seems to be a micro AV leadless PPM.     She did not take her medications this am, she usually does not take until noon. BP at home has been controlled per daughter. Rarely uses lasix. Did see a nephrologist for decreased kidney function, there were no recommendations.     SUMMARY:   Problem List  Date Reviewed: 2/15/2023            Codes Class Noted    Pacemaker ICD-10-CM: Z95.0  ICD-9-CM: V45.01  11/21/2022        Primary hypertension ICD-10-CM: I10  ICD-9-CM: 401.9  11/21/2022        Valvular heart disease ICD-10-CM: I38  ICD-9-CM: 424.90  4/26/2022        Lower extremity edema ICD-10-CM: R60.0  ICD-9-CM: 782.3  4/26/2022        LBBB (left bundle branch block) ICD-10-CM: I44.7  ICD-9-CM: 426.3  4/26/2022        S/P CABG x 3 ICD-10-CM: Z95.1  ICD-9-CM: V45.81  4/27/2012    Overview Signed 4/27/2012 12:23 PM by Jose Carrasquillo PA-C     RODRIGUEZ to LAD, RSVG to OM, RSVG to RCA             Coronary artery disease ICD-10-CM: I25.10  ICD-9-CM: 414.00  4/25/2012        Mixed hyperlipidemia ICD-10-CM: E78.2  ICD-9-CM: 272.2  2/2/2011           Current Outpatient Medications on File Prior to Visit   Medication Sig    lisinopriL (PRINIVIL, ZESTRIL) 10 mg tablet Take  by mouth daily. pantoprazole (PROTONIX) 40 mg tablet Take 40 mg by mouth daily. furosemide (LASIX) 20 mg tablet Take  by mouth daily. ONLY WHEN NEEDED. simvastatin (ZOCOR) 40 mg tablet Take  by mouth nightly. nitroglycerin (NITROSTAT) 0.4 mg SL tablet 1 Tab by SubLINGual route every five (5) minutes as needed for Chest Pain. aspirin 81 mg tablet Take 81 mg by mouth daily. No current facility-administered medications on file prior to visit. CARDIOLOGY STUDIES TO DATE:  No results found for this visit on 02/15/23.         03/23/12    NM CARDIAC SPECT W STRS / REST MULT 03/26/2012 3/26/2012    Narrative  **Final Report**      ICD Codes / Adm. Diagnosis: 786.50  786.59 / CHEST PAIN, UNSPECIFIED  Examination:  NM CARD SPECT [facility]  - [id number] - Mar 23 2012 11:16AM  Accession No:  [id number]  Reason:  chest pains      REPORT:  One day rest/stress Lexiscan gated SPECT myocardial perfusion study    INDICATION:    RADIOISOTOPE:  Resting dose: 10 mCi Tc 99m Sestamibi  Stress dose: 30 mCi Tc 99m Sestamibi    STRESS TEST: Chest pain noted during Lexiscan Injection. Resting EKG noted  NSR and LBBB. There were further 2 mm ST depression in inferior and lateral  leads per stress report after Lexiscan injection.     MYOCARDIAL PERFUSION: Stress and rest tomograms demonstrate a large sized,  mod-to-high grade grade, reversible defect involving the proximal to distal  anteroseptal, septal, inferoseptal and apical lindsey.  Findings indicate  ischemia in the LAD and RCA distribution. LV cavity size is normal.    GATED STUDY: LV systolic function moderately depressed with LVEF 34%. There  is septal dyskinesis. IMPRESSION: 1) Subjectively positive test.  2) Abnormal Lexiscan gated SPECT  myocardial perfusion study. Stress and rest tomograms demonstrate a large  sized, mod-to-high grade grade, reversible defect involving the proximal to  distal anteroseptal, septal, inferoseptal and apical walls. Findings  indicate ischemia in the LAD and RCA distribution. LVEF 34%. Signing/Reading Doctor: Paul Prieto ([id number])  Approved: Paul Prieto ([id number])  03/26/2012    Signed by: Diamond Hill MD on 3/26/2012 11:11 AM         CARDIAC ROS:   negative    Past Medical History:   Diagnosis Date    Anemia     CAD (coronary artery disease)     Carotid disease, bilateral ([address]     Coronary atherosclerosis of native coronary artery 3/27/2012    Essential hypertension     GERD (gastroesophageal reflux disease)     Hypertension     CONTROLLED BY MEDS.     Macular degeneration 2004    Mixed hyperlipidemia        Family History   Problem Relation Age of Onset    Coronary Art Dis Mother     Cancer Father     Diabetes Sister     Coronary Art Dis Brother        Social History     Socioeconomic History    Marital status:      Spouse name: Not on file    Number of children: Not on file    Years of education: Not on file    Highest education level: Not on file   Occupational History    Not on file   Tobacco Use    Smoking status: Never    Smokeless tobacco: Never   Substance and Sexual Activity    Alcohol use: No    Drug use: Yes     Types: OTC, Prescription    Sexual activity: Never   Other Topics Concern    Not on file   Social History Narrative    Not on file     Social Determinants of Health     Financial Resource Strain: Not on file   Food Insecurity: Not on file   Transportation Needs: Not on file   Physical Activity: Not on file Stress: Not on file   Social Connections: Not on file   Intimate Partner Violence: Not on file   Housing Stability: Not on file        GENERAL ROS:  A comprehensive review of systems was negative except for that written in the HPI. Visit Vitals  BP (!) 166/110 (BP 1 Location: Left upper arm, BP Patient Position: Sitting)   Pulse (!) 58   Ht 5' 4\" (1.626 m)   Wt 114 lb (51.7 kg)   BMI 19.57 kg/m²     Vitals:    02/15/23 0828 02/15/23 0851   BP: (!) 180/100 (!) 166/110   Pulse: (!) 58    Weight: 114 lb (51.7 kg)    Height: 5' 4\" (1.626 m)         Wt Readings from Last 3 Encounters:   02/15/23 114 lb (51.7 kg)   11/21/22 115 lb (52.2 kg)   04/26/22 124 lb 12.8 oz (56.6 kg)            BP Readings from Last 3 Encounters:   02/15/23 (!) 166/110   11/21/22 (!) 160/88   04/26/22 (!) 185/80       PHYSICAL EXAM  General appearance: alert, cooperative, no distress, appears stated age  Neck: supple, symmetrical, trachea midline, no adenopathy, thyroid: not enlarged, symmetric, no tenderness/mass/nodules, no carotid bruit, and no JVD  Lungs: clear to auscultation bilaterally  Heart: regular rate and rhythm, S1, S2 normal, no murmur, click, rub or gallop  Extremities: extremities normal, atraumatic, no cyanosis or edema    No results found for: CHOL, CHOLX, CHLST, CHOLV, 357858, HDL, HDLP, LDL, LDLC, DLDLP, TGLX, TRIGL, TRIGP, CHHD, CHHDX    Lab Results   Component Value Date/Time    WBC 6.2 10/24/2015 05:30 PM    HGB 13.3 10/24/2015 05:30 PM    HCT 40.5 10/24/2015 05:30 PM    PLATELET 927 51/30/9736 05:30 PM    MCV 97.6 10/24/2015 05:30 PM        No results found for: CHOL, CHOLPOCT, CHOLX, CHLST, CHOLV, HDL, HDLP, LDL, LDLC, DLDLP, TGLX, TRIGL, TRIGP, TGLPOCT, NTGLT, CHHD, CHHDX     ASSESSMENT  Diagnoses and all orders for this visit:    1. LBBB (left bundle branch block)    2. Coronary artery disease involving native coronary artery of native heart without angina pectoris    3. Valvular heart disease    4.  S/P CABG x 3    5. Mixed hyperlipidemia    6. Lower extremity edema    7. Pacemaker    8. Primary hypertension     Continue current medications. Encounter Diagnoses   Name Primary? LBBB (left bundle branch block) Yes    Coronary artery disease involving native coronary artery of native heart without angina pectoris     Valvular heart disease     S/P CABG x 3     Mixed hyperlipidemia     Lower extremity edema     Pacemaker     Primary hypertension        No orders of the defined types were placed in this encounter. [address]  2/15/2023        [address]   [address]  [address]  [phone] (F)    [address] [address] [address]  [phone] (P)  [phone] (F)    ATTENTION:   This medical record was transcribed using an electronic medical records/speech recognition system. Although proofread, it may and can contain electronic, spelling and other errors. Corrections may be executed at a later time. Please feel free to contact us for any clarifications as needed.

## 2023-02-15 NOTE — PROGRESS NOTES
Chief Complaint   Patient presents with    Follow-up     Annual     Coronary Artery Disease    Leg Swelling    Hypertension    Other     LBBB     Vitals:    02/15/23 0828 02/15/23 0851   BP: (!) 180/100 (!) 166/110   BP 1 Location: Right upper arm Left upper arm   BP Patient Position:  Sitting   Pulse: (!) 58    Height: 5' 4\" (1.626 m)    Weight: 114 lb (51.7 kg)      Couldn't get  O2stat.      Chest pain denied     SOB denied     Palpitations denied     Swelling in hands/feet denied     Dizziness denied     Recent hospital stays denied     Refills denied

## 2023-02-15 NOTE — PROGRESS NOTES
Cardiac Electrophysiology Office Follow-up    NAME:  Gerard Carmona   :  1923  MRM:  [id number]    Date:  2/15/2023     Primary Cardiologist: Dr. Michelle Perry and Plan:     1. Presence of permanent cardiac pacemaker  2. Coronary artery disease involving native coronary artery of native heart without angina pectoris  3. S/P CABG x 3  4. Valvular heart disease  5. Primary hypertension  6. Mixed hyperlipidemia       Pacemaker. Implanted 22 with Dr. Keisha Escobar in [place]. MICRA leadless pacemaker  Greater than 8 years on battery  VDD 60  99.9% V-paced  No arrhythmias noted  - Continue Q3 month remote transmissions. Follow-up in EP clinic in one year, or sooner for any concerns. CAD.  - S/p CABG x3.  - Continue aspirin and statin. Hypertension.  - Controlled on lisinopril. - Echocardiogram done in her primary care office dated 2022. Report states normal left ventricular size and ejection fraction. Left atrial enlargement. Right atrial enlargement. Mild aortic insufficiency. Moderate mitral regurgitation. Mild to moderate pulmonic insufficiency. Moderate tricuspid regurgitation. Pulmonary hypertension. The PASP is reported as 45 mmHg. Ejection fraction 55%      Subjective:     Gerard Carmona, a 80y.o. year-old who presents for followup of pacemaker. Review of Systems:   12 point review of systems was performed. All negative except for HPI    Exam:     Physical Exam:  There were no vitals taken for this visit.     PHYSICAL EXAM  General:  Alert and oriented, in no acute distress  Head:  Atraumatic, normocephalic  Eyes:  extraocular muscles intact  Neck:  Supple, normal range of motion  Lungs:  Clear to auscultation bilaterally, no wheezes/rales/rhonchi   Cardiovascular:  Regular rate and rhythm, normal S1-S2, no murmurs/rubs/gallops  Abdomen:  Soft, nontender, nondistended, normoactive bowel sounds  Skin:  Intact, no rash  Extremities:, no clubbing, cyanosis, or edema  Musculoskeletal: normal range of motion  Neurological:  Alert and oriented, no focal neurologic deficits  Psychiatric:  Normal mood and affect      Medications:     Current Outpatient Medications   Medication Sig    lisinopriL (PRINIVIL, ZESTRIL) 10 mg tablet Take  by mouth daily. pantoprazole (PROTONIX) 40 mg tablet Take 40 mg by mouth daily. furosemide (LASIX) 20 mg tablet Take  by mouth daily. ONLY WHEN NEEDED. simvastatin (ZOCOR) 40 mg tablet Take  by mouth nightly. nitroglycerin (NITROSTAT) 0.4 mg SL tablet 1 Tab by SubLINGual route every five (5) minutes as needed for Chest Pain. aspirin 81 mg tablet Take 81 mg by mouth daily. No current facility-administered medications for this visit. Diagnostic Data Review:       Results for orders placed or performed during the hospital encounter of 04/27/12   EKG, 12 LEAD, INITIAL   Result Value Ref Range    Ventricular Rate 113 BPM    Atrial Rate 117 BPM    QRS Duration 114 ms    Q-T Interval 294 ms    QTC Calculation (Bezet) 403 ms    Calculated R Axis -49 degrees    Calculated T Axis 54 degrees    Diagnosis       Atrial fibrillation  Nonspecific intraventricular conduction delay  Left axis deviation  Low voltage QRS  Possible Anterolateral infarct , age undetermined  When compared with ECG of 25-APR-2012 11:57,  Atrial fibrillation is now present  Vent. rate has increased BY  54 BPM  Borderline criteria for Anterior infarct are now present  Borderline criteria for Anterolateral infarct are now present  Confirmed by Rachel Swain M.D., Lesly Counts ([id number]) on 4/30/2012 3:35:33 PM      1. Myocardial perfusion study   3/27/08 - Normal Adenosine perfusion with EF 57%    2. Cholesterol profile   4/27/13 - , HDL 61, LDL 72, TG 82  10/13/15 - , , HDL 50,   4/5/16- , HDL 54, , TG 84    3. Carotid duplex  9/12/11 - Intermediate disease bilateral.   2/10/17- 10-49% L/R      4.  Cardiac catheterization  3/12 - EF 65%, 95% oLAD, pLAD 95%, mLAD 70% followed by 70%, pCX 60%, OM1 50%, pRCA 50%,pRCA 100% (collateralized)    5. CABG   4/27/12 - X3 with LIMA to LAD, SVG to OM and SVG to RCA      Lab Review:   No results found for: CHOL, CHOLX, CHLST, CHOLV, 389459, HDL, HDLP, LDL, LDLC, DLDLP, TGLX, TRIGL, TRIGP, CHHD, CHHDX  Lab Results   Component Value Date/Time    Creatinine 1.22 (H) 10/24/2015 05:30 PM     Lab Results   Component Value Date/Time    BUN 26 (H) 10/24/2015 05:30 PM     Lab Results   Component Value Date/Time    Potassium 4.4 10/24/2015 05:30 PM     Lab Results   Component Value Date/Time    Hemoglobin A1c 5.6 04/25/2012 11:24 AM     Lab Results   Component Value Date/Time    HGB 13.3 10/24/2015 05:30 PM     Lab Results   Component Value Date/Time    PLATELET 595 72/11/2450 05:30 PM                  ___________________________________________________    Anita Lucero M.D.   Electrophysiology/Cardiology  [facility]  [address], [address] [facility] [address] [address] [address]                               [phone]

## 2023-02-15 NOTE — PROGRESS NOTES
Pacemaker Check. Implanted 6/14/22 with Dr. Eveline Akhtar in [place]. MI[employer] leadless pacemaker  Greater than 8 years on battery  Underlying CHB. VDD 60  99.9% V-paced  No arrhythmias noted  - Continue Q3 month remote transmissions.

## 2023-02-15 NOTE — PROGRESS NOTES
Kathryn Wilson is a 80 y.o. female    Vitals:    02/15/23 0828   Height: 5' 4\" (1.626 m)       Chief Complaint   Patient presents with    Follow-up     Annual     Coronary Artery Disease    Leg Swelling    Hypertension    Other     LBBB       Chest pain NO  SOB NO  Dizziness NO  Swelling NO  Recent hospital visit NO  Refills NO  COVID VACCINE YES  HAD COVID?  NO